# Patient Record
(demographics unavailable — no encounter records)

---

## 2024-10-21 NOTE — HISTORY OF PRESENT ILLNESS
[FreeTextEntry1] : CPE [de-identified] : 51 yo m, pmhx of GERD, prediabetes, HLD, here for CPE overall is feeling well worsening GERD, on pepcid regularly  worsening in the past few weeks has been chronic  trying to cut back on acidic foods denies any other associated symptoms  denies any other complaints or concerns at this time

## 2024-10-21 NOTE — HEALTH RISK ASSESSMENT
[1 or 2 (0 pts)] : 1 or 2 (0 points) [0] : 2) Feeling down, depressed, or hopeless: Not at all (0) [PHQ-2 Negative - No further assessment needed] : PHQ-2 Negative - No further assessment needed [Patient reported colonoscopy was normal] : Patient reported colonoscopy was normal [HIV test declined] : HIV test declined [Hepatitis C test declined] : Hepatitis C test declined [Employed] : employed [Sexually Active] : sexually active [Feels Safe at Home] : Feels safe at home [Fully functional (bathing, dressing, toileting, transferring, walking, feeding)] : Fully functional (bathing, dressing, toileting, transferring, walking, feeding) [Fully functional (using the telephone, shopping, preparing meals, housekeeping, doing laundry, using] : Fully functional and needs no help or supervision to perform IADLs (using the telephone, shopping, preparing meals, housekeeping, doing laundry, using transportation, managing medications and managing finances) [Very Good] : ~his/her~  mood as very good [No] : No [Never (0 pts)] : Never (0 points) [Never] : Never [Significant Other] : lives with significant other [FreeTextEntry1] : ^ see above  [de-identified] : none  [de-identified] : GI- acid reflex, colonoscopy and endoscopy [Audit-CScore] : 0 [de-identified] : walking  [de-identified] : balanced; supplements- vitamin d and b  [[id number]] : 0  [Change in mental status noted] : No change in mental status noted [Language] : denies difficulty with language [High Risk Behavior] : no high risk behavior [Reports changes in hearing] : Reports no changes in hearing [Reports changes in vision] : Reports no changes in vision [Reports changes in dental health] : Reports no changes in dental health [ColonoscopyDate] : 2021  [de-identified] : daughter   [FreeTextEntry2] :   [de-identified] : condoms  [de-identified] : wears glasses

## 2024-10-21 NOTE — HEALTH RISK ASSESSMENT
[1 or 2 (0 pts)] : 1 or 2 (0 points) [0] : 2) Feeling down, depressed, or hopeless: Not at all (0) [PHQ-2 Negative - No further assessment needed] : PHQ-2 Negative - No further assessment needed [Patient reported colonoscopy was normal] : Patient reported colonoscopy was normal [HIV test declined] : HIV test declined [Hepatitis C test declined] : Hepatitis C test declined [Employed] : employed [Sexually Active] : sexually active [Feels Safe at Home] : Feels safe at home [Fully functional (bathing, dressing, toileting, transferring, walking, feeding)] : Fully functional (bathing, dressing, toileting, transferring, walking, feeding) [Fully functional (using the telephone, shopping, preparing meals, housekeeping, doing laundry, using] : Fully functional and needs no help or supervision to perform IADLs (using the telephone, shopping, preparing meals, housekeeping, doing laundry, using transportation, managing medications and managing finances) [Very Good] : ~his/her~  mood as very good [No] : No [Never (0 pts)] : Never (0 points) [Never] : Never [Significant Other] : lives with significant other [FreeTextEntry1] : ^ see above  [de-identified] : none  [de-identified] : GI- acid reflex, colonoscopy and endoscopy [Audit-CScore] : 0 [de-identified] : walking  [de-identified] : balanced; supplements- vitamin d and b  [[id number]] : 0  [Change in mental status noted] : No change in mental status noted [Language] : denies difficulty with language [High Risk Behavior] : no high risk behavior [Reports changes in hearing] : Reports no changes in hearing [Reports changes in vision] : Reports no changes in vision [Reports changes in dental health] : Reports no changes in dental health [ColonoscopyDate] : 2021  [de-identified] : daughter   [FreeTextEntry2] :   [de-identified] : condoms  [de-identified] : wears glasses

## 2024-10-21 NOTE — HISTORY OF PRESENT ILLNESS
[FreeTextEntry1] : CPE [de-identified] : 51 yo m, pmhx of GERD, prediabetes, HLD, here for CPE overall is feeling well worsening GERD, on pepcid regularly  worsening in the past few weeks has been chronic  trying to cut back on acidic foods denies any other associated symptoms  denies any other complaints or concerns at this time

## 2024-10-21 NOTE — ASSESSMENT
[FreeTextEntry1] : Routine medical examination VSS- exam normal  medication as prescribed, medication education done  Advised healthy diet and exercise reviewed labs with patient  repeat cr in 1 month patient verbalizes understanding and patient is stable upon discharge